# Patient Record
Sex: FEMALE | Race: WHITE | Employment: UNEMPLOYED | URBAN - METROPOLITAN AREA
[De-identification: names, ages, dates, MRNs, and addresses within clinical notes are randomized per-mention and may not be internally consistent; named-entity substitution may affect disease eponyms.]

---

## 2019-11-29 ENCOUNTER — APPOINTMENT (OUTPATIENT)
Dept: CT IMAGING | Age: 64
End: 2019-11-29
Attending: EMERGENCY MEDICINE
Payer: SELF-PAY

## 2019-11-29 ENCOUNTER — HOSPITAL ENCOUNTER (EMERGENCY)
Age: 64
Discharge: HOME | End: 2019-11-29
Attending: EMERGENCY MEDICINE
Payer: SELF-PAY

## 2019-11-29 VITALS
HEART RATE: 72 BPM | SYSTOLIC BLOOD PRESSURE: 182 MMHG | TEMPERATURE: 98.5 F | DIASTOLIC BLOOD PRESSURE: 86 MMHG | WEIGHT: 213 LBS | OXYGEN SATURATION: 98 % | BODY MASS INDEX: 41.82 KG/M2 | RESPIRATION RATE: 17 BRPM | HEIGHT: 60 IN

## 2019-11-29 DIAGNOSIS — S06.0X0A CONCUSSION WITHOUT LOSS OF CONSCIOUSNESS, INITIAL ENCOUNTER: Primary | ICD-10-CM

## 2019-11-29 PROCEDURE — 70450 CT HEAD/BRAIN W/O DYE: CPT

## 2019-11-29 PROCEDURE — 99284 EMERGENCY DEPT VISIT MOD MDM: CPT | Performed by: EMERGENCY MEDICINE

## 2019-11-29 NOTE — ED PROVIDER NOTES
[facility] Emergency Department  Arrival Date/Time: 11/29/2019 11:15 AM      Tariq Botello  MRN: [id number]    YOB: 1955   62 y.o. female    [facility] EMERGENCY DEPT ER04/04  Seen on 12/1/2019 @ 11:22 AM      Today's Chief Complaint:   Chief Complaint   Patient presents with   Nicolette Diaz     HPI: 60-year-old female having breakfast at the [employer] on [employer]. She dropped her card on the floor and when she stood up she lost her balance fell backwards landing on her buttocks and and striking her head. No loss of consciousness no confusion. She was assisted up    The [employer] staff was concerned because it sounded like a big hollow coconut hitting the floor and advised her to get care. She was transported to the emergency department by EMS for evaluation    She is awake alert oriented. She is answering questions following commands. Tongue and facial movements are intact. Extraocular movements are intact. HPI    Review of Systems: Review of Systems   Constitutional: Negative. HENT: Negative. Respiratory: Negative. Cardiovascular: Negative. Gastrointestinal: Negative. Genitourinary: Negative. Neurological: Positive for headaches. Psychiatric/Behavioral: Negative. Past Medical History: Primary Care Doctor: Bshsi, Not On File  Meds, PMH, PSHx, SocHx at end of this note     Allergies: Allergies   Allergen Reactions    Sulfa (Sulfonamide Antibiotics) Hives         Key Anti-Platelet Anticoagulant Meds     The patient is on no antiplatelet meds or anticoagulants. Physical Exam:  Nursing documentation reviewed. Vitals:    11/29/19 1116 11/29/19 1150 11/29/19 1228   BP: 179/80  182/86   Pulse: 89  72   Resp: 17  17   Temp: 98.5 °F (36.9 °C)     SpO2: 98% 98% 98%    Vital signs were reviewed. Physical Exam  Vitals signs and nursing note reviewed. Constitutional:       General: She is not in acute distress. Appearance: Normal appearance. She is not ill-appearing or toxic-appearing. HENT:      Head: Normocephalic and atraumatic. Right Ear: Tympanic membrane normal.      Left Ear: Tympanic membrane normal.   Eyes:      Extraocular Movements: Extraocular movements intact. Pupils: Pupils are equal, round, and reactive to light. Neck:      Musculoskeletal: Normal range of motion and neck supple. No neck rigidity or muscular tenderness. Cardiovascular:      Rate and Rhythm: Normal rate and regular rhythm. Pulses: Normal pulses. Heart sounds: Normal heart sounds. Pulmonary:      Effort: No respiratory distress. Breath sounds: Normal breath sounds. No stridor. Abdominal:      General: There is no distension. Tenderness: There is no tenderness. Skin:     General: Skin is warm and dry. Capillary Refill: Capillary refill takes less than 2 seconds. Neurological:      General: No focal deficit present. Mental Status: She is alert and oriented to person, place, and time. Psychiatric:         Mood and Affect: Mood normal.         Behavior: Behavior normal.         MEDICAL DECISION MAKING:   Differential Diagnosis:    MDM  Number of Diagnoses or Management Options  Concussion without loss of consciousness, initial encounter:   Diagnosis management comments: Patient status post fall at the [employer]. She is awake alert oriented answering questions following commands    Head CT will be performed. Amount and/or Complexity of Data Reviewed  Tests in the radiology section of CPT®: ordered and reviewed    Risk of Complications, Morbidity, and/or Mortality  Presenting problems: moderate  Diagnostic procedures: minimal  Management options: moderate          Data/Management:    Lab findings during this visit: No results found for this or any previous visit (from the past 48 hour(s)).     Radiology studies during this visit: Ct Head Wo Cont    Result Date: 11/29/2019  Head CT INDICATION: Head injury, fall with injury to the back of the head Multiple axial images obtained through the brain without intravenous contrast. Radiation dose reduction techniques were used for this study: All CT scans performed at this facility use one or all of the following: Automated exposure control, adjustment of the mA and/or kVp according to patient's size, iterative reconstruction. FINDINGS: No areas of abnormal attenuation are seen in the brain. There is no CT evidence of acute hemorrhage or infarction. The ventricles are normal in size. There are no extra-axial fluid collections. No masses are seen. The sinuses are clear. There are no bony lesions. IMPRESSION: No CT evidence of acute intracranial abnormality. Medications given in the ED: Medications - No data to display    Recheck and Additional Documentation:  (use .addrecheck  . addsepsis   . addstroke   . addhip  . addhandoff  . addcctime)     Head CT is negative for intracranial abnormality. Discussed these findings with the patient and her family. She is given instructions regarding closed head injury and discharged home. Procedure Documentation:   Procedures     Other ED Course Notes:        Assessment and Plan:    Impression:     ICD-10-CM ICD-9-CM   1. Concussion without loss of consciousness, initial encounter S06.0X0A 850.0     Disposition: Discharged     Follow-up:   Follow-up Information     Follow up With Specialties Details Why [facility] EMERGENCY DEPT Emergency Medicine   [address] [id number]          Discharge Medications: There are no discharge medications for this patient. Past Medical History:    History reviewed. No pertinent past medical history.   Past Surgical History:   Procedure Laterality Date    HX TONSILLECTOMY      IR CHOLECYSTOSTOMY PERCUTANEOUS       Social History     Tobacco Use    Smoking status: Never Smoker    Smokeless tobacco: Never Used   Substance Use Topics    Alcohol use: Never     Frequency: Never    Drug use: Never     None

## 2019-11-29 NOTE — DISCHARGE INSTRUCTIONS
Watch for confusion under changes and thinking. If you start having difficulty walking speaking or any other problems come back right away    CT HEAD WO CONT   Final Result   IMPRESSION: No CT evidence of acute intracranial abnormality.

## 2019-11-29 NOTE — ROUTINE PROCESS
I have reviewed discharge instructions with the patient. The patient verbalized understanding. Patient left ED via Discharge Method: ambulatory to Home with spouse. Opportunity for questions and clarification provided. Patient given 0 scripts. To continue your aftercare when you leave the hospital, you may receive an automated call from our care team to check in on how you are doing. This is a free service and part of our promise to provide the best care and service to meet your aftercare needs.  If you have questions, or wish to unsubscribe from this service please call [phone]. Thank you for Choosing our [address] [place] [address] Emergency Department.

## 2019-11-29 NOTE — ED TRIAGE NOTES
Pt arrived to ED via EMS, pt lost balance and fell hitting the back of her head. Pt denies any loc. /99 per EMS. .